# Patient Record
Sex: FEMALE | Race: OTHER | NOT HISPANIC OR LATINO
[De-identification: names, ages, dates, MRNs, and addresses within clinical notes are randomized per-mention and may not be internally consistent; named-entity substitution may affect disease eponyms.]

---

## 2022-03-22 ENCOUNTER — APPOINTMENT (OUTPATIENT)
Dept: CARDIOLOGY | Facility: HOSPITAL | Age: 67
End: 2022-03-22

## 2022-03-22 ENCOUNTER — NON-APPOINTMENT (OUTPATIENT)
Age: 67
End: 2022-03-22

## 2022-03-22 VITALS
HEIGHT: 62 IN | TEMPERATURE: 97 F | HEART RATE: 97 BPM | RESPIRATION RATE: 16 BRPM | SYSTOLIC BLOOD PRESSURE: 149 MMHG | DIASTOLIC BLOOD PRESSURE: 78 MMHG | OXYGEN SATURATION: 100 %

## 2022-03-22 DIAGNOSIS — Z82.3 FAMILY HISTORY OF STROKE: ICD-10-CM

## 2022-03-22 DIAGNOSIS — Z82.49 FAMILY HISTORY OF ISCHEMIC HEART DISEASE AND OTHER DISEASES OF THE CIRCULATORY SYSTEM: ICD-10-CM

## 2022-03-22 DIAGNOSIS — E11.9 TYPE 2 DIABETES MELLITUS W/OUT COMPLICATIONS: ICD-10-CM

## 2022-03-22 PROBLEM — Z00.00 ENCOUNTER FOR PREVENTIVE HEALTH EXAMINATION: Status: ACTIVE | Noted: 2022-03-22

## 2022-03-22 RX ORDER — LOSARTAN POTASSIUM 100 MG/1
100 TABLET, FILM COATED ORAL DAILY
Qty: 1 | Refills: 2 | Status: ACTIVE | COMMUNITY
Start: 2022-03-22 | End: 1900-01-01

## 2022-03-22 RX ORDER — INSULIN LISPRO 100 [IU]/ML
100 INJECTION, SOLUTION INTRAVENOUS; SUBCUTANEOUS
Refills: 0 | Status: ACTIVE | COMMUNITY
Start: 2022-03-22

## 2022-03-22 RX ORDER — AMLODIPINE BESYLATE 10 MG/1
10 TABLET ORAL DAILY
Qty: 1 | Refills: 1 | Status: ACTIVE | COMMUNITY
Start: 2022-03-22

## 2022-03-22 RX ORDER — METFORMIN ER 500 MG 500 MG/1
500 TABLET ORAL DAILY
Refills: 0 | Status: ACTIVE | COMMUNITY
Start: 2022-03-22

## 2022-03-22 RX ORDER — METOPROLOL SUCCINATE 25 MG/1
25 TABLET, EXTENDED RELEASE ORAL DAILY
Qty: 60 | Refills: 0 | Status: ACTIVE | COMMUNITY
Start: 2022-03-22

## 2022-03-22 RX ORDER — AMITRIPTYLINE HYDROCHLORIDE 25 MG/1
25 TABLET, FILM COATED ORAL DAILY
Refills: 0 | Status: ACTIVE | COMMUNITY
Start: 2022-03-22

## 2022-03-22 RX ORDER — INSULIN GLARGINE 100 [IU]/ML
100 INJECTION, SOLUTION SUBCUTANEOUS
Refills: 0 | Status: ACTIVE | COMMUNITY
Start: 2022-03-22

## 2022-03-22 RX ORDER — CLOPIDOGREL BISULFATE 75 MG/1
75 TABLET, FILM COATED ORAL DAILY
Qty: 90 | Refills: 2 | Status: ACTIVE | COMMUNITY
Start: 2022-03-22

## 2022-03-22 RX ORDER — LOSARTAN POTASSIUM 50 MG/1
50 TABLET, FILM COATED ORAL DAILY
Refills: 0 | Status: DISCONTINUED | COMMUNITY
Start: 2022-03-22 | End: 2022-03-22

## 2022-03-23 NOTE — ASSESSMENT
[FreeTextEntry1] : 67yo woman with HTN, IDDM2, presenting to [facility] cardiology clinic after referral by community PCP. Patient lived in [facility] and is visiting family. Patient complains of intermittent palpitations that lasts 30 seconds to 1 minute occurring 1-2x per month for many years. Episodes of palpitations are symptomatic (lightheadedness, fatigue). \par \par #Intermittent Palpitations\par - Obtain extended holter monitor as first step\par \par HTN/IDDM2, hypertensive in clinic today\par - Increase losartan to 100 mg daily\par - Continue follow up with PCP \par \par Case discussed with clinic attending Dr. Dominguez\par \par William Liu MD\par Cardiology Fellow, PGY4

## 2022-03-23 NOTE — HISTORY OF PRESENT ILLNESS
[FreeTextEntry1] : PCP: Dr. Gaby Encinas\par \par 65yo woman with history of HTN, IDDM2, presenting to [facility] cardiology clinic after referral by community PCP. Patient lived in [facility] and is visiting family. Patient complains of intermittent palpitations that lasts 30 seconds to 1 minute occurring 1-2x per month. THis has been going on for years (30-40 according to patient). Thyroid studies in 11/29/2021 was within normal limits at PCP. HbA1c 7.8% and LDL 51 at that time. \par \par Patient saw a cardiologist in [facility] in 11/2019. At that time TTE was done showing preserved LVEF (64%) and stage 1 diastolic dysfunction, unremarkable carotid Doppler, and normal PFTs. \par \par ECG done today reveals NSR with no ischemic changes. \par \par

## 2022-04-19 ENCOUNTER — APPOINTMENT (OUTPATIENT)
Dept: OPHTHALMOLOGY | Facility: CLINIC | Age: 67
End: 2022-04-19
Payer: MEDICAID

## 2022-04-19 ENCOUNTER — NON-APPOINTMENT (OUTPATIENT)
Age: 67
End: 2022-04-19

## 2022-04-19 PROCEDURE — 92004 COMPRE OPH EXAM NEW PT 1/>: CPT

## 2022-04-19 PROCEDURE — 92286 ANT SGM IMG I&R SPECLR MIC: CPT

## 2022-04-19 PROCEDURE — 92015 DETERMINE REFRACTIVE STATE: CPT | Mod: NC

## 2022-05-11 ENCOUNTER — APPOINTMENT (OUTPATIENT)
Dept: OPHTHALMOLOGY | Facility: CLINIC | Age: 67
End: 2022-05-11
Payer: MEDICAID

## 2022-05-11 ENCOUNTER — NON-APPOINTMENT (OUTPATIENT)
Age: 67
End: 2022-05-11

## 2022-05-11 PROCEDURE — 66821 AFTER CATARACT LASER SURGERY: CPT | Mod: RT

## 2022-05-17 ENCOUNTER — APPOINTMENT (OUTPATIENT)
Dept: CARDIOLOGY | Facility: HOSPITAL | Age: 67
End: 2022-05-17

## 2022-05-17 VITALS
TEMPERATURE: 97.5 F | HEIGHT: 62 IN | SYSTOLIC BLOOD PRESSURE: 130 MMHG | DIASTOLIC BLOOD PRESSURE: 70 MMHG | HEART RATE: 87 BPM | WEIGHT: 152 LBS | BODY MASS INDEX: 27.97 KG/M2 | OXYGEN SATURATION: 99 %

## 2022-05-17 DIAGNOSIS — I10 ESSENTIAL (PRIMARY) HYPERTENSION: ICD-10-CM

## 2022-05-17 DIAGNOSIS — I87.2 VENOUS INSUFFICIENCY (CHRONIC) (PERIPHERAL): ICD-10-CM

## 2022-05-17 DIAGNOSIS — R00.2 PALPITATIONS: ICD-10-CM

## 2022-05-17 DIAGNOSIS — F32.A DEPRESSION, UNSPECIFIED: ICD-10-CM

## 2022-05-17 NOTE — ASSESSMENT
[FreeTextEntry1] : 67yo woman with HTN, IDDM2, presented to [facility] cardiology clinic after referral by community PCP for intermittent palpitations (symptomatic). \par \par #Intermittent Palpitations\par - Extended holter monitor with no events after 2 weeks\par \par HTN/IDDM2, hypertensive in clinic today\par - Continue losartan to 100 mg daily\par - Continue follow up with PCP \par \par #Venous insufficiency \par - Recommend leg elevation and compression stockings\par \par Case discussed with clinic attending Dr. Dominguez\par \par William Liu MD\par Cardiology Fellow, PGY4

## 2022-05-17 NOTE — HISTORY OF PRESENT ILLNESS
[FreeTextEntry1] : Today patient arrives 22 minutes late for her 30 minute visit. \par \par PCP: Dr. Gaby Encinas\par \par 65yo woman with history of HTN, IDDM2, initially presented to [facility] cardiology clinic 3/22/2022 after referral by community PCP for complaint of intermittent palpitations. Patient lives in [facility] and is visiting family. Patient saw a cardiologist in [facility] in 11/2019. At that time TTE was done showing preserved LVEF (64%) and stage 1 diastolic dysfunction, unremarkable carotid Doppler, and normal PFTs. Biotel cardiac monitor ordered. 12 days of continuous monitoring revealed no arrhythmia events, only occasional sinus tachycardia. \par \par Patient reports having had 2 episodes of palpitations while wearing her Biotel patch, however only sinus tachycardia with fastest rate 140 bpm recorded. Patient also complains of leg swelling at the end of the day with occasional left calf cramping. \par \par

## 2022-05-23 ENCOUNTER — APPOINTMENT (OUTPATIENT)
Dept: OPHTHALMOLOGY | Facility: CLINIC | Age: 67
End: 2022-05-23

## 2022-05-25 ENCOUNTER — NON-APPOINTMENT (OUTPATIENT)
Age: 67
End: 2022-05-25

## 2022-05-25 ENCOUNTER — APPOINTMENT (OUTPATIENT)
Dept: OPHTHALMOLOGY | Facility: CLINIC | Age: 67
End: 2022-05-25
Payer: MEDICAID

## 2022-05-25 PROCEDURE — 66821 AFTER CATARACT LASER SURGERY: CPT | Mod: 79,LT

## 2022-07-14 ENCOUNTER — APPOINTMENT (OUTPATIENT)
Dept: VASCULAR SURGERY | Facility: CLINIC | Age: 67
End: 2022-07-14

## 2022-07-14 VITALS
SYSTOLIC BLOOD PRESSURE: 128 MMHG | HEIGHT: 62 IN | DIASTOLIC BLOOD PRESSURE: 73 MMHG | WEIGHT: 169.76 LBS | TEMPERATURE: 97.3 F | HEART RATE: 84 BPM | BODY MASS INDEX: 31.24 KG/M2

## 2022-07-14 DIAGNOSIS — I73.9 PERIPHERAL VASCULAR DISEASE, UNSPECIFIED: ICD-10-CM

## 2022-07-14 PROCEDURE — 93970 EXTREMITY STUDY: CPT

## 2022-07-14 PROCEDURE — 99204 OFFICE O/P NEW MOD 45 MIN: CPT

## 2022-07-14 NOTE — HISTORY OF PRESENT ILLNESS
[FreeTextEntry1] : 65 yo female with pmh HTN and DM presented for evaluation of her bilateral LE swelling and left LE pain. She states that swelling worsens during the day and when she lies down at night till morning is completely gone. She also complains of atypical left LE pain with walking, more consistent with swelling. Jordan rest pain.

## 2022-07-14 NOTE — ASSESSMENT
[FreeTextEntry1] : 66 female presented for evaluation LE peripheral arterial disease. \par \par no evidence of arterial or venous insufficiency\par \par recommended to pt to use compression stocking and leg elevation\par \par follow up as needed

## 2022-07-14 NOTE — PHYSICAL EXAM
[JVD] : no jugular venous distention  [Normal Breath Sounds] : Normal breath sounds [Normal Heart Sounds] : normal heart sounds [2+] : left 2+ [Ankle Swelling (On Exam)] : not present [Varicose Veins Of Lower Extremities] : not present [] : not present [Abdomen Tenderness] : ~T ~M No abdominal tenderness [No Rash or Lesion] : No rash or lesion [Alert] : alert [Oriented to Person] : oriented to person [Oriented to Place] : oriented to place [de-identified] : NAD [de-identified] : WNL

## 2022-07-26 ENCOUNTER — APPOINTMENT (OUTPATIENT)
Dept: CARDIOLOGY | Facility: HOSPITAL | Age: 67
End: 2022-07-26

## 2022-09-27 ENCOUNTER — APPOINTMENT (OUTPATIENT)
Dept: OPHTHALMOLOGY | Facility: CLINIC | Age: 67
End: 2022-09-27